# Patient Record
Sex: FEMALE | Race: WHITE | NOT HISPANIC OR LATINO | URBAN - METROPOLITAN AREA
[De-identification: names, ages, dates, MRNs, and addresses within clinical notes are randomized per-mention and may not be internally consistent; named-entity substitution may affect disease eponyms.]

---

## 2017-03-09 ENCOUNTER — INPATIENT (INPATIENT)
Facility: HOSPITAL | Age: 82
LOS: 3 days | Discharge: NURSING, REHAB OR LONG-TERM CARE | DRG: 594 | End: 2017-03-13
Attending: INTERNAL MEDICINE | Admitting: INTERNAL MEDICINE
Payer: COMMERCIAL

## 2017-03-09 VITALS
TEMPERATURE: 98 F | OXYGEN SATURATION: 98 % | SYSTOLIC BLOOD PRESSURE: 144 MMHG | WEIGHT: 130.07 LBS | RESPIRATION RATE: 14 BRPM | HEART RATE: 98 BPM | DIASTOLIC BLOOD PRESSURE: 80 MMHG

## 2017-03-09 DIAGNOSIS — L98.499 NON-PRESSURE CHRONIC ULCER OF SKIN OF OTHER SITES WITH UNSPECIFIED SEVERITY: ICD-10-CM

## 2017-03-09 LAB
ANION GAP SERPL CALC-SCNC: 8 MMOL/L — SIGNIFICANT CHANGE UP (ref 5–17)
APTT BLD: 29.1 SEC — SIGNIFICANT CHANGE UP (ref 27.5–37.4)
BASOPHILS # BLD AUTO: 0.1 K/UL — SIGNIFICANT CHANGE UP (ref 0–0.2)
BASOPHILS NFR BLD AUTO: 0.8 % — SIGNIFICANT CHANGE UP (ref 0–2)
BUN SERPL-MCNC: 12 MG/DL — SIGNIFICANT CHANGE UP (ref 7–23)
CALCIUM SERPL-MCNC: 9 MG/DL — SIGNIFICANT CHANGE UP (ref 8.5–10.1)
CHLORIDE SERPL-SCNC: 103 MMOL/L — SIGNIFICANT CHANGE UP (ref 96–108)
CO2 SERPL-SCNC: 27 MMOL/L — SIGNIFICANT CHANGE UP (ref 22–31)
CREAT SERPL-MCNC: 0.72 MG/DL — SIGNIFICANT CHANGE UP (ref 0.5–1.3)
EOSINOPHIL # BLD AUTO: 0 K/UL — SIGNIFICANT CHANGE UP (ref 0–0.5)
EOSINOPHIL NFR BLD AUTO: 0.3 % — SIGNIFICANT CHANGE UP (ref 0–6)
GLUCOSE SERPL-MCNC: 95 MG/DL — SIGNIFICANT CHANGE UP (ref 70–99)
HCT VFR BLD CALC: 41.8 % — SIGNIFICANT CHANGE UP (ref 34.5–45)
HGB BLD-MCNC: 13.6 G/DL — SIGNIFICANT CHANGE UP (ref 11.5–15.5)
INR BLD: 1.04 RATIO — SIGNIFICANT CHANGE UP (ref 0.88–1.16)
LACTATE SERPL-SCNC: 0.8 MMOL/L — SIGNIFICANT CHANGE UP (ref 0.7–2)
LYMPHOCYTES # BLD AUTO: 1.2 K/UL — SIGNIFICANT CHANGE UP (ref 1–3.3)
LYMPHOCYTES # BLD AUTO: 14.9 % — SIGNIFICANT CHANGE UP (ref 13–44)
MCHC RBC-ENTMCNC: 32.3 PG — SIGNIFICANT CHANGE UP (ref 27–34)
MCHC RBC-ENTMCNC: 32.4 GM/DL — SIGNIFICANT CHANGE UP (ref 32–36)
MCV RBC AUTO: 99.6 FL — SIGNIFICANT CHANGE UP (ref 80–100)
MONOCYTES # BLD AUTO: 0.7 K/UL — SIGNIFICANT CHANGE UP (ref 0–0.9)
MONOCYTES NFR BLD AUTO: 8.7 % — SIGNIFICANT CHANGE UP (ref 1–9)
NEUTROPHILS # BLD AUTO: 5.9 K/UL — SIGNIFICANT CHANGE UP (ref 1.8–7.4)
NEUTROPHILS NFR BLD AUTO: 75.3 % — SIGNIFICANT CHANGE UP (ref 43–77)
PLATELET # BLD AUTO: 377 K/UL — SIGNIFICANT CHANGE UP (ref 150–400)
POTASSIUM SERPL-MCNC: 3.9 MMOL/L — SIGNIFICANT CHANGE UP (ref 3.5–5.3)
POTASSIUM SERPL-SCNC: 3.9 MMOL/L — SIGNIFICANT CHANGE UP (ref 3.5–5.3)
PROTHROM AB SERPL-ACNC: 11.5 SEC — SIGNIFICANT CHANGE UP (ref 10–13.1)
RBC # BLD: 4.2 M/UL — SIGNIFICANT CHANGE UP (ref 3.8–5.2)
RBC # FLD: 12.2 % — SIGNIFICANT CHANGE UP (ref 10.3–14.5)
SODIUM SERPL-SCNC: 138 MMOL/L — SIGNIFICANT CHANGE UP (ref 135–145)
WBC # BLD: 7.9 K/UL — SIGNIFICANT CHANGE UP (ref 3.8–10.5)
WBC # FLD AUTO: 7.9 K/UL — SIGNIFICANT CHANGE UP (ref 3.8–10.5)

## 2017-03-09 PROCEDURE — 73590 X-RAY EXAM OF LOWER LEG: CPT | Mod: 26,LT

## 2017-03-09 PROCEDURE — 99285 EMERGENCY DEPT VISIT HI MDM: CPT

## 2017-03-09 PROCEDURE — 93010 ELECTROCARDIOGRAM REPORT: CPT

## 2017-03-09 PROCEDURE — 71010: CPT | Mod: 26

## 2017-03-09 RX ORDER — FOLIC ACID 0.8 MG
1 TABLET ORAL DAILY
Qty: 0 | Refills: 0 | Status: DISCONTINUED | OUTPATIENT
Start: 2017-03-09 | End: 2017-03-13

## 2017-03-09 RX ORDER — SODIUM CHLORIDE 9 MG/ML
1000 INJECTION INTRAMUSCULAR; INTRAVENOUS; SUBCUTANEOUS ONCE
Qty: 0 | Refills: 0 | Status: COMPLETED | OUTPATIENT
Start: 2017-03-09 | End: 2017-03-09

## 2017-03-09 RX ORDER — PIPERACILLIN AND TAZOBACTAM 4; .5 G/20ML; G/20ML
3.38 INJECTION, POWDER, LYOPHILIZED, FOR SOLUTION INTRAVENOUS ONCE
Qty: 0 | Refills: 0 | Status: COMPLETED | OUTPATIENT
Start: 2017-03-09 | End: 2017-03-09

## 2017-03-09 RX ORDER — SIMVASTATIN 20 MG/1
40 TABLET, FILM COATED ORAL AT BEDTIME
Qty: 0 | Refills: 0 | Status: DISCONTINUED | OUTPATIENT
Start: 2017-03-09 | End: 2017-03-13

## 2017-03-09 RX ORDER — ACETAMINOPHEN 500 MG
650 TABLET ORAL EVERY 6 HOURS
Qty: 0 | Refills: 0 | Status: DISCONTINUED | OUTPATIENT
Start: 2017-03-09 | End: 2017-03-13

## 2017-03-09 RX ORDER — GABAPENTIN 400 MG/1
300 CAPSULE ORAL DAILY
Qty: 0 | Refills: 0 | Status: DISCONTINUED | OUTPATIENT
Start: 2017-03-09 | End: 2017-03-13

## 2017-03-09 RX ORDER — PIPERACILLIN AND TAZOBACTAM 4; .5 G/20ML; G/20ML
3.38 INJECTION, POWDER, LYOPHILIZED, FOR SOLUTION INTRAVENOUS EVERY 8 HOURS
Qty: 0 | Refills: 0 | Status: DISCONTINUED | OUTPATIENT
Start: 2017-03-10 | End: 2017-03-13

## 2017-03-09 RX ADMIN — SIMVASTATIN 40 MILLIGRAM(S): 20 TABLET, FILM COATED ORAL at 22:00

## 2017-03-09 RX ADMIN — SODIUM CHLORIDE 1000 MILLILITER(S): 9 INJECTION INTRAMUSCULAR; INTRAVENOUS; SUBCUTANEOUS at 18:33

## 2017-03-09 RX ADMIN — PIPERACILLIN AND TAZOBACTAM 200 GRAM(S): 4; .5 INJECTION, POWDER, LYOPHILIZED, FOR SOLUTION INTRAVENOUS at 18:33

## 2017-03-09 NOTE — ED PROVIDER NOTE - SKIN, MLM
LLE: 15X8cm ulcer with eschar and mild surrounding erythema to mid posterior lower leg.  Clear d/c.  FROM. Motor 5/5, sensation intact, 2+DP

## 2017-03-09 NOTE — ED PROVIDER NOTE - OBJECTIVE STATEMENT
85 y/o F sent in by pmd Dr hSine and wound care for L calf wound.  Pt sustained laceration to her L calf from a sharp edge of a bus staircase 4 weeks ago.  Wound repaired at [facility].  Took course of keflex.  Now has increasing ulcer and pain at site.  Denies fever or other complaints.

## 2017-03-09 NOTE — ED ADULT TRIAGE NOTE - CHIEF COMPLAINT QUOTE
S/P fall and sustained a laceration to left lower leg. 3 weeks ago. Patient is sent by PMD today for admission and IV antibiotics.

## 2017-03-09 NOTE — ED ADULT NURSE NOTE - OBJECTIVE STATEMENT
Pt arrived c/o left leg wound sent my PMD for IV antibiotics. Pt feel 3 weeks ago and now left leg wound is infected per pt. No acute distress, pt has open wound to left lower extremity, no drainage, bandage applied by MD. Pt alert VSS, afebrile. IV started labs drawn and sent, fluids and antibiotics infusing per order, pt tolerating well. Percocet given for pain, will reassess.

## 2017-03-10 DIAGNOSIS — L98.499 NON-PRESSURE CHRONIC ULCER OF SKIN OF OTHER SITES WITH UNSPECIFIED SEVERITY: ICD-10-CM

## 2017-03-10 DIAGNOSIS — I10 ESSENTIAL (PRIMARY) HYPERTENSION: ICD-10-CM

## 2017-03-10 LAB
ANION GAP SERPL CALC-SCNC: 10 MMOL/L — SIGNIFICANT CHANGE UP (ref 5–17)
BUN SERPL-MCNC: 8 MG/DL — SIGNIFICANT CHANGE UP (ref 7–23)
CALCIUM SERPL-MCNC: 7.9 MG/DL — LOW (ref 8.5–10.1)
CHLORIDE SERPL-SCNC: 105 MMOL/L — SIGNIFICANT CHANGE UP (ref 96–108)
CO2 SERPL-SCNC: 26 MMOL/L — SIGNIFICANT CHANGE UP (ref 22–31)
CREAT SERPL-MCNC: 0.54 MG/DL — SIGNIFICANT CHANGE UP (ref 0.5–1.3)
GLUCOSE SERPL-MCNC: 71 MG/DL — SIGNIFICANT CHANGE UP (ref 70–99)
HCT VFR BLD CALC: 35.9 % — SIGNIFICANT CHANGE UP (ref 34.5–45)
HGB BLD-MCNC: 11.6 G/DL — SIGNIFICANT CHANGE UP (ref 11.5–15.5)
MCHC RBC-ENTMCNC: 32.1 PG — SIGNIFICANT CHANGE UP (ref 27–34)
MCHC RBC-ENTMCNC: 32.3 GM/DL — SIGNIFICANT CHANGE UP (ref 32–36)
MCV RBC AUTO: 99.4 FL — SIGNIFICANT CHANGE UP (ref 80–100)
PLATELET # BLD AUTO: 305 K/UL — SIGNIFICANT CHANGE UP (ref 150–400)
POTASSIUM SERPL-MCNC: 3.5 MMOL/L — SIGNIFICANT CHANGE UP (ref 3.5–5.3)
POTASSIUM SERPL-SCNC: 3.5 MMOL/L — SIGNIFICANT CHANGE UP (ref 3.5–5.3)
RBC # BLD: 3.61 M/UL — LOW (ref 3.8–5.2)
RBC # FLD: 12.5 % — SIGNIFICANT CHANGE UP (ref 10.3–14.5)
SODIUM SERPL-SCNC: 141 MMOL/L — SIGNIFICANT CHANGE UP (ref 135–145)
WBC # BLD: 7.4 K/UL — SIGNIFICANT CHANGE UP (ref 3.8–10.5)
WBC # FLD AUTO: 7.4 K/UL — SIGNIFICANT CHANGE UP (ref 3.8–10.5)

## 2017-03-10 RX ORDER — COLLAGENASE CLOSTRIDIUM HIST. 250 UNIT/G
1 OINTMENT (GRAM) TOPICAL DAILY
Qty: 0 | Refills: 0 | Status: DISCONTINUED | OUTPATIENT
Start: 2017-03-10 | End: 2017-03-13

## 2017-03-10 RX ADMIN — PIPERACILLIN AND TAZOBACTAM 25 GRAM(S): 4; .5 INJECTION, POWDER, LYOPHILIZED, FOR SOLUTION INTRAVENOUS at 12:52

## 2017-03-10 RX ADMIN — Medication 1 MILLIGRAM(S): at 11:45

## 2017-03-10 RX ADMIN — Medication 10 MILLIGRAM(S): at 06:10

## 2017-03-10 RX ADMIN — GABAPENTIN 300 MILLIGRAM(S): 400 CAPSULE ORAL at 11:45

## 2017-03-10 RX ADMIN — PIPERACILLIN AND TAZOBACTAM 25 GRAM(S): 4; .5 INJECTION, POWDER, LYOPHILIZED, FOR SOLUTION INTRAVENOUS at 17:41

## 2017-03-10 RX ADMIN — SIMVASTATIN 40 MILLIGRAM(S): 20 TABLET, FILM COATED ORAL at 21:08

## 2017-03-10 RX ADMIN — PIPERACILLIN AND TAZOBACTAM 25 GRAM(S): 4; .5 INJECTION, POWDER, LYOPHILIZED, FOR SOLUTION INTRAVENOUS at 03:40

## 2017-03-10 RX ADMIN — Medication 1 APPLICATION(S): at 14:24

## 2017-03-10 NOTE — DISCHARGE NOTE ADULT - CARE PROVIDER_API CALL
Natty Shine (TERESA), Internal Medicine  [address] [place] [id number]  Phone: [phone]  Fax: [phone]    Tommy Hernandez), Plastic Surgery  [address] [place] [address]  Phone: [phone]  Fax: [phone]

## 2017-03-10 NOTE — DISCHARGE NOTE ADULT - REASON FOR ADMISSION
sent by wound care doctor from [facility] for worseing left calf wound that happened on 2/14. pt scraped herself while getting of the bus and was on one course of keflex, wound not getting better. sent by wound care doctor from [facility] for worsening left calf wound that happened on 2/14. pt scraped herself while getting of the bus and was on one course of keflex, wound not getting better.

## 2017-03-10 NOTE — DISCHARGE NOTE ADULT - HOSPITAL COURSE
patient came with non healing left LE wound. Wound necrotic. Given iv abx for mild cellulitis. cultures negative. wound care recommended local wound care and f/u with wound care center within 1 week. seen by ID too.

## 2017-03-10 NOTE — PHYSICAL THERAPY INITIAL EVALUATION ADULT - GAIT DEVIATIONS NOTED, PT EVAL
decreased step length/hunched and crouched posture, leaning twd right side/decreased weight-shifting ability/decreased velocity of limb motion/decreased stride length

## 2017-03-10 NOTE — DISCHARGE NOTE ADULT - MEDICATION SUMMARY - MEDICATIONS TO STOP TAKING
I will STOP taking the medications listed below when I get home from the hospital:    Keflex  --  by mouth

## 2017-03-10 NOTE — DISCHARGE NOTE ADULT - ABILITY TO HEAR (WITH HEARING AID OR HEARING APPLIANCE IF NORMALLY USED):
hearing aids/Mildly to Moderately Impaired: difficulty hearing in some environments or speaker may need to increase volume or speak distinctly

## 2017-03-10 NOTE — DISCHARGE NOTE ADULT - PATIENT PORTAL LINK FT
“You can access the [facility] Patient Portal, offered by [facility], by registering with the following website: [email or web address]”

## 2017-03-10 NOTE — PHYSICAL THERAPY INITIAL EVALUATION ADULT - GENERAL OBSERVATIONS, REHAB EVAL
Pt received in bed on 1 east /c eldest son present. Pt was NAD but felt that she needed reposition herself. Pt and son agreeable for PT eval.

## 2017-03-10 NOTE — H&P ADULT. - REASON FOR ADMISSION
sent by wound care doctor from [facility] for worseing left calf wound that happened on 2/14. pt scraped herself while getting of the bus and was on one course of keflex, wound not getting better.

## 2017-03-10 NOTE — PHYSICAL THERAPY INITIAL EVALUATION ADULT - IMPAIRMENTS FOUND, PT EVAL
gait, locomotion, and balance/aerobic capacity/endurance/integumentary integrity/muscle strength/posture

## 2017-03-10 NOTE — PHYSICAL THERAPY INITIAL EVALUATION ADULT - IMPAIRMENTS CONTRIBUTING TO GAIT DEVIATIONS, PT EVAL
narrow base of support/pain/impaired postural control/decreased strength/impaired balance/decreased flexibility/impaired coordination

## 2017-03-10 NOTE — PHYSICAL THERAPY INITIAL EVALUATION ADULT - PERTINENT HX OF CURRENT PROBLEM, REHAB EVAL
As per H&P: Pt is a 85 y/o female "sent by wound care doctor from [facility] for worseing left calf wound that happened on 2/14. pt scraped herself while getting of the bus and was on one course of keflex, wound not getting better."

## 2017-03-10 NOTE — PHYSICAL THERAPY INITIAL EVALUATION ADULT - IMPAIRED TRANSFERS: SIT/STAND, REHAB EVAL
decreased strength/left calf/decreased flexibility/impaired balance/narrow base of support/pain/impaired postural control

## 2017-03-10 NOTE — PHYSICAL THERAPY INITIAL EVALUATION ADULT - ADDITIONAL COMMENTS
Pt lives in a private home /c her youngest son. Pt has 5 CHONG and 1 handrail, none to negotiate indoors. Pt is on main level. Pt was independent /c all functional mobility and ADLs /c cane prior to admission. pt has 2 canes and 1 rollator which she uses prn. Pt attends the senior center where she participates with activities like dancing. sons are very supportive.

## 2017-03-10 NOTE — DISCHARGE NOTE ADULT - COMMUNITY RESOURCES
Pt to be discharged to [facility] at [facility]. # [phone]

## 2017-03-10 NOTE — PHYSICAL THERAPY INITIAL EVALUATION ADULT - PATIENT/FAMILY/SIGNIFICANT OTHER GOALS STATEMENT, PT EVAL
Pt prefers to go home at [facility] but son thinks pt may need some rehab. Pt would be agreeable if she needed

## 2017-03-10 NOTE — PHYSICAL THERAPY INITIAL EVALUATION ADULT - RANGE OF MOTION EXAMINATION, REHAB EVAL
slight limitation in left ankle DF due to/bilateral lower extremity ROM was WFL (within functional limits)/bilateral upper extremity ROM was WFL (within functional limits)/deficits as listed below

## 2017-03-10 NOTE — PHYSICAL THERAPY INITIAL EVALUATION ADULT - SKIN COLOR/CHARACTERISTICS
redness blanchable/black discoloration noted to base of wound, slight yellow slough noted to perimeter of wound. MILLA Veliz aware and applied new dressing, gauze and topical medication as per MD orders.

## 2017-03-10 NOTE — DISCHARGE NOTE ADULT - MEDICATION SUMMARY - MEDICATIONS TO TAKE
I will START or STAY ON the medications listed below when I get home from the hospital:    acetaminophen-oxyCODONE 325 mg-5 mg oral tablet  -- 1 tab(s) by mouth every 6 hours, As needed, mod-severe  -- Indication: For pain    acetaminophen 325 mg oral tablet  -- 2 tab(s) by mouth every 6 hours, As needed, Mild Pain (1 - 3)  -- Indication: For pain    enalapril  -- 10  by mouth once a day  -- Indication: For HTN (hypertension)    gabapentin 300 mg oral capsule  -- 1 cap(s) by mouth once a day  -- Indication: For Neuropathy    simvastatin 40 mg oral tablet  -- 1 tab(s) by mouth once a day (at bedtime)  -- Indication: For cholesterol    collagenase 250 units/g topical ointment  -- 1 application on skin once a day, to wound and apply DSD qd  -- Indication: For wound    famotidine 20 mg oral tablet  -- 1 tab(s) by mouth 2 times a day  -- Indication: For gerd    senna oral tablet  -- 2 tab(s) by mouth once a day (at bedtime)  -- Indication: For gi    Augmentin 875 mg-125 mg oral tablet  -- 1 tab(s) by mouth every 12 hours x 7 days  -- Indication: For wound    lactobacillus acidophilus oral capsule  -- 1 cap(s) by mouth 3 times a day  -- Indication: For sup    folic acid 1 mg oral tablet  -- 1 tab(s) by mouth once a day  -- Indication: For supp

## 2017-03-10 NOTE — DISCHARGE NOTE ADULT - CARE PLAN
Principal Discharge DX:	Ulcer  Goal:	.  Instructions for follow-up, activity and diet:	con't wound care and abx. follow up with wound care within 1 week [phone] Principal Discharge DX:	Ulcer  Goal:	.  Instructions for follow-up, activity and diet:	con't wound care and abx. follow up with wound care within 1 week [phone]

## 2017-03-11 RX ADMIN — PIPERACILLIN AND TAZOBACTAM 25 GRAM(S): 4; .5 INJECTION, POWDER, LYOPHILIZED, FOR SOLUTION INTRAVENOUS at 17:14

## 2017-03-11 RX ADMIN — GABAPENTIN 300 MILLIGRAM(S): 400 CAPSULE ORAL at 11:00

## 2017-03-11 RX ADMIN — PIPERACILLIN AND TAZOBACTAM 25 GRAM(S): 4; .5 INJECTION, POWDER, LYOPHILIZED, FOR SOLUTION INTRAVENOUS at 10:49

## 2017-03-11 RX ADMIN — SIMVASTATIN 40 MILLIGRAM(S): 20 TABLET, FILM COATED ORAL at 22:02

## 2017-03-11 RX ADMIN — Medication 1 APPLICATION(S): at 14:04

## 2017-03-11 RX ADMIN — Medication 1 MILLIGRAM(S): at 11:00

## 2017-03-11 RX ADMIN — PIPERACILLIN AND TAZOBACTAM 25 GRAM(S): 4; .5 INJECTION, POWDER, LYOPHILIZED, FOR SOLUTION INTRAVENOUS at 03:06

## 2017-03-11 RX ADMIN — Medication 10 MILLIGRAM(S): at 05:08

## 2017-03-12 LAB
ANION GAP SERPL CALC-SCNC: 11 MMOL/L — SIGNIFICANT CHANGE UP (ref 5–17)
BASOPHILS # BLD AUTO: 0.1 K/UL — SIGNIFICANT CHANGE UP (ref 0–0.2)
BASOPHILS NFR BLD AUTO: 1 % — SIGNIFICANT CHANGE UP (ref 0–2)
BUN SERPL-MCNC: 8 MG/DL — SIGNIFICANT CHANGE UP (ref 7–23)
CALCIUM SERPL-MCNC: 8.6 MG/DL — SIGNIFICANT CHANGE UP (ref 8.5–10.1)
CHLORIDE SERPL-SCNC: 106 MMOL/L — SIGNIFICANT CHANGE UP (ref 96–108)
CO2 SERPL-SCNC: 24 MMOL/L — SIGNIFICANT CHANGE UP (ref 22–31)
CREAT SERPL-MCNC: 0.74 MG/DL — SIGNIFICANT CHANGE UP (ref 0.5–1.3)
CULTURE RESULTS: SIGNIFICANT CHANGE UP
EOSINOPHIL # BLD AUTO: 0.1 K/UL — SIGNIFICANT CHANGE UP (ref 0–0.5)
EOSINOPHIL NFR BLD AUTO: 1.4 % — SIGNIFICANT CHANGE UP (ref 0–6)
GLUCOSE SERPL-MCNC: 98 MG/DL — SIGNIFICANT CHANGE UP (ref 70–99)
HCT VFR BLD CALC: 37.8 % — SIGNIFICANT CHANGE UP (ref 34.5–45)
HGB BLD-MCNC: 12.8 G/DL — SIGNIFICANT CHANGE UP (ref 11.5–15.5)
LYMPHOCYTES # BLD AUTO: 1.4 K/UL — SIGNIFICANT CHANGE UP (ref 1–3.3)
LYMPHOCYTES # BLD AUTO: 18 % — SIGNIFICANT CHANGE UP (ref 13–44)
MCHC RBC-ENTMCNC: 32.4 PG — SIGNIFICANT CHANGE UP (ref 27–34)
MCHC RBC-ENTMCNC: 33.9 GM/DL — SIGNIFICANT CHANGE UP (ref 32–36)
MCV RBC AUTO: 95.8 FL — SIGNIFICANT CHANGE UP (ref 80–100)
MONOCYTES # BLD AUTO: 0.8 K/UL — SIGNIFICANT CHANGE UP (ref 0–0.9)
MONOCYTES NFR BLD AUTO: 10.2 % — HIGH (ref 1–9)
NEUTROPHILS # BLD AUTO: 5.5 K/UL — SIGNIFICANT CHANGE UP (ref 1.8–7.4)
NEUTROPHILS NFR BLD AUTO: 69.4 % — SIGNIFICANT CHANGE UP (ref 43–77)
PLATELET # BLD AUTO: 339 K/UL — SIGNIFICANT CHANGE UP (ref 150–400)
POTASSIUM SERPL-MCNC: 3.5 MMOL/L — SIGNIFICANT CHANGE UP (ref 3.5–5.3)
POTASSIUM SERPL-SCNC: 3.5 MMOL/L — SIGNIFICANT CHANGE UP (ref 3.5–5.3)
RBC # BLD: 3.95 M/UL — SIGNIFICANT CHANGE UP (ref 3.8–5.2)
RBC # FLD: 11.7 % — SIGNIFICANT CHANGE UP (ref 10.3–14.5)
SODIUM SERPL-SCNC: 141 MMOL/L — SIGNIFICANT CHANGE UP (ref 135–145)
SPECIMEN SOURCE: SIGNIFICANT CHANGE UP
WBC # BLD: 8 K/UL — SIGNIFICANT CHANGE UP (ref 3.8–10.5)
WBC # FLD AUTO: 8 K/UL — SIGNIFICANT CHANGE UP (ref 3.8–10.5)

## 2017-03-12 RX ORDER — FAMOTIDINE 10 MG/ML
20 INJECTION INTRAVENOUS
Qty: 0 | Refills: 0 | Status: DISCONTINUED | OUTPATIENT
Start: 2017-03-12 | End: 2017-03-13

## 2017-03-12 RX ORDER — LACTOBACILLUS ACIDOPHILUS 100MM CELL
1 CAPSULE ORAL
Qty: 0 | Refills: 0 | Status: DISCONTINUED | OUTPATIENT
Start: 2017-03-12 | End: 2017-03-13

## 2017-03-12 RX ORDER — SENNA PLUS 8.6 MG/1
2 TABLET ORAL AT BEDTIME
Qty: 0 | Refills: 0 | Status: DISCONTINUED | OUTPATIENT
Start: 2017-03-12 | End: 2017-03-13

## 2017-03-12 RX ORDER — HEPARIN SODIUM 5000 [USP'U]/ML
5000 INJECTION INTRAVENOUS; SUBCUTANEOUS EVERY 12 HOURS
Qty: 0 | Refills: 0 | Status: DISCONTINUED | OUTPATIENT
Start: 2017-03-12 | End: 2017-03-13

## 2017-03-12 RX ADMIN — Medication 1 TABLET(S): at 17:25

## 2017-03-12 RX ADMIN — Medication 1 MILLIGRAM(S): at 11:54

## 2017-03-12 RX ADMIN — Medication 1 APPLICATION(S): at 11:54

## 2017-03-12 RX ADMIN — GABAPENTIN 300 MILLIGRAM(S): 400 CAPSULE ORAL at 11:54

## 2017-03-12 RX ADMIN — Medication 1 TABLET(S): at 11:54

## 2017-03-12 RX ADMIN — SIMVASTATIN 40 MILLIGRAM(S): 20 TABLET, FILM COATED ORAL at 21:23

## 2017-03-12 RX ADMIN — Medication 5 MILLIGRAM(S): at 17:25

## 2017-03-12 RX ADMIN — SENNA PLUS 2 TABLET(S): 8.6 TABLET ORAL at 21:23

## 2017-03-12 RX ADMIN — PIPERACILLIN AND TAZOBACTAM 25 GRAM(S): 4; .5 INJECTION, POWDER, LYOPHILIZED, FOR SOLUTION INTRAVENOUS at 06:02

## 2017-03-12 RX ADMIN — FAMOTIDINE 20 MILLIGRAM(S): 10 INJECTION INTRAVENOUS at 17:25

## 2017-03-12 RX ADMIN — HEPARIN SODIUM 5000 UNIT(S): 5000 INJECTION INTRAVENOUS; SUBCUTANEOUS at 17:25

## 2017-03-12 RX ADMIN — PIPERACILLIN AND TAZOBACTAM 25 GRAM(S): 4; .5 INJECTION, POWDER, LYOPHILIZED, FOR SOLUTION INTRAVENOUS at 13:50

## 2017-03-12 RX ADMIN — Medication 1 TABLET(S): at 07:58

## 2017-03-13 VITALS
SYSTOLIC BLOOD PRESSURE: 120 MMHG | OXYGEN SATURATION: 95 % | HEART RATE: 95 BPM | DIASTOLIC BLOOD PRESSURE: 64 MMHG | RESPIRATION RATE: 16 BRPM | TEMPERATURE: 99 F

## 2017-03-13 LAB
ANION GAP SERPL CALC-SCNC: 11 MMOL/L — SIGNIFICANT CHANGE UP (ref 5–17)
BUN SERPL-MCNC: 7 MG/DL — SIGNIFICANT CHANGE UP (ref 7–23)
CALCIUM SERPL-MCNC: 8.6 MG/DL — SIGNIFICANT CHANGE UP (ref 8.5–10.1)
CHLORIDE SERPL-SCNC: 106 MMOL/L — SIGNIFICANT CHANGE UP (ref 96–108)
CO2 SERPL-SCNC: 23 MMOL/L — SIGNIFICANT CHANGE UP (ref 22–31)
CREAT SERPL-MCNC: 0.81 MG/DL — SIGNIFICANT CHANGE UP (ref 0.5–1.3)
GLUCOSE SERPL-MCNC: 95 MG/DL — SIGNIFICANT CHANGE UP (ref 70–99)
HCT VFR BLD CALC: 36 % — SIGNIFICANT CHANGE UP (ref 34.5–45)
HGB BLD-MCNC: 11.7 G/DL — SIGNIFICANT CHANGE UP (ref 11.5–15.5)
MCHC RBC-ENTMCNC: 32.3 PG — SIGNIFICANT CHANGE UP (ref 27–34)
MCHC RBC-ENTMCNC: 32.4 GM/DL — SIGNIFICANT CHANGE UP (ref 32–36)
MCV RBC AUTO: 99.4 FL — SIGNIFICANT CHANGE UP (ref 80–100)
PLATELET # BLD AUTO: 326 K/UL — SIGNIFICANT CHANGE UP (ref 150–400)
POTASSIUM SERPL-MCNC: 3.7 MMOL/L — SIGNIFICANT CHANGE UP (ref 3.5–5.3)
POTASSIUM SERPL-SCNC: 3.7 MMOL/L — SIGNIFICANT CHANGE UP (ref 3.5–5.3)
RBC # BLD: 3.62 M/UL — LOW (ref 3.8–5.2)
RBC # FLD: 12.2 % — SIGNIFICANT CHANGE UP (ref 10.3–14.5)
SODIUM SERPL-SCNC: 140 MMOL/L — SIGNIFICANT CHANGE UP (ref 135–145)
WBC # BLD: 7.3 K/UL — SIGNIFICANT CHANGE UP (ref 3.8–10.5)
WBC # FLD AUTO: 7.3 K/UL — SIGNIFICANT CHANGE UP (ref 3.8–10.5)

## 2017-03-13 PROCEDURE — 71045 X-RAY EXAM CHEST 1 VIEW: CPT

## 2017-03-13 PROCEDURE — 93005 ELECTROCARDIOGRAM TRACING: CPT

## 2017-03-13 PROCEDURE — 99285 EMERGENCY DEPT VISIT HI MDM: CPT | Mod: 25

## 2017-03-13 PROCEDURE — 73590 X-RAY EXAM OF LOWER LEG: CPT

## 2017-03-13 PROCEDURE — 96376 TX/PRO/DX INJ SAME DRUG ADON: CPT

## 2017-03-13 PROCEDURE — 97530 THERAPEUTIC ACTIVITIES: CPT

## 2017-03-13 PROCEDURE — 87040 BLOOD CULTURE FOR BACTERIA: CPT

## 2017-03-13 PROCEDURE — 84145 PROCALCITONIN (PCT): CPT

## 2017-03-13 PROCEDURE — 80048 BASIC METABOLIC PNL TOTAL CA: CPT

## 2017-03-13 PROCEDURE — 96374 THER/PROPH/DIAG INJ IV PUSH: CPT

## 2017-03-13 PROCEDURE — 85730 THROMBOPLASTIN TIME PARTIAL: CPT

## 2017-03-13 PROCEDURE — 85027 COMPLETE CBC AUTOMATED: CPT

## 2017-03-13 PROCEDURE — 85610 PROTHROMBIN TIME: CPT

## 2017-03-13 PROCEDURE — 97162 PT EVAL MOD COMPLEX 30 MIN: CPT

## 2017-03-13 PROCEDURE — 83605 ASSAY OF LACTIC ACID: CPT

## 2017-03-13 PROCEDURE — 87070 CULTURE OTHR SPECIMN AEROBIC: CPT

## 2017-03-13 PROCEDURE — 97116 GAIT TRAINING THERAPY: CPT

## 2017-03-13 RX ORDER — OXYCODONE HYDROCHLORIDE 5 MG/1
1 TABLET ORAL
Qty: 0 | Refills: 0 | COMMUNITY
Start: 2017-03-13

## 2017-03-13 RX ORDER — SENNA PLUS 8.6 MG/1
2 TABLET ORAL
Qty: 0 | Refills: 0 | COMMUNITY
Start: 2017-03-13

## 2017-03-13 RX ORDER — SIMVASTATIN 20 MG/1
1 TABLET, FILM COATED ORAL
Qty: 0 | Refills: 0 | COMMUNITY
Start: 2017-03-13

## 2017-03-13 RX ORDER — COLLAGENASE CLOSTRIDIUM HIST. 250 UNIT/G
1 OINTMENT (GRAM) TOPICAL
Qty: 0 | Refills: 0 | COMMUNITY
Start: 2017-03-13

## 2017-03-13 RX ORDER — GABAPENTIN 400 MG/1
1 CAPSULE ORAL
Qty: 0 | Refills: 0 | COMMUNITY
Start: 2017-03-13

## 2017-03-13 RX ORDER — ACETAMINOPHEN 500 MG
2 TABLET ORAL
Qty: 0 | Refills: 0 | COMMUNITY
Start: 2017-03-13

## 2017-03-13 RX ORDER — FAMOTIDINE 10 MG/ML
1 INJECTION INTRAVENOUS
Qty: 0 | Refills: 0 | COMMUNITY
Start: 2017-03-13

## 2017-03-13 RX ORDER — LACTOBACILLUS ACIDOPHILUS 100MM CELL
1 CAPSULE ORAL
Qty: 0 | Refills: 0 | COMMUNITY
Start: 2017-03-13

## 2017-03-13 RX ORDER — FOLIC ACID 0.8 MG
1 TABLET ORAL
Qty: 0 | Refills: 0 | COMMUNITY
Start: 2017-03-13

## 2017-03-13 RX ADMIN — PIPERACILLIN AND TAZOBACTAM 25 GRAM(S): 4; .5 INJECTION, POWDER, LYOPHILIZED, FOR SOLUTION INTRAVENOUS at 17:53

## 2017-03-13 RX ADMIN — Medication 1 TABLET(S): at 11:35

## 2017-03-13 RX ADMIN — PIPERACILLIN AND TAZOBACTAM 25 GRAM(S): 4; .5 INJECTION, POWDER, LYOPHILIZED, FOR SOLUTION INTRAVENOUS at 02:45

## 2017-03-13 RX ADMIN — Medication 5 MILLIGRAM(S): at 05:11

## 2017-03-13 RX ADMIN — Medication 5 MILLIGRAM(S): at 17:50

## 2017-03-13 RX ADMIN — GABAPENTIN 300 MILLIGRAM(S): 400 CAPSULE ORAL at 11:35

## 2017-03-13 RX ADMIN — HEPARIN SODIUM 5000 UNIT(S): 5000 INJECTION INTRAVENOUS; SUBCUTANEOUS at 05:11

## 2017-03-13 RX ADMIN — FAMOTIDINE 20 MILLIGRAM(S): 10 INJECTION INTRAVENOUS at 17:50

## 2017-03-13 RX ADMIN — HEPARIN SODIUM 5000 UNIT(S): 5000 INJECTION INTRAVENOUS; SUBCUTANEOUS at 17:50

## 2017-03-13 RX ADMIN — PIPERACILLIN AND TAZOBACTAM 25 GRAM(S): 4; .5 INJECTION, POWDER, LYOPHILIZED, FOR SOLUTION INTRAVENOUS at 11:34

## 2017-03-13 RX ADMIN — Medication 1 APPLICATION(S): at 18:40

## 2017-03-13 RX ADMIN — FAMOTIDINE 20 MILLIGRAM(S): 10 INJECTION INTRAVENOUS at 05:11

## 2017-03-13 RX ADMIN — Medication 1 TABLET(S): at 08:13

## 2017-03-13 RX ADMIN — Medication 1 TABLET(S): at 17:50

## 2017-03-13 RX ADMIN — Medication 1 MILLIGRAM(S): at 11:35

## 2017-03-15 LAB
CULTURE RESULTS: SIGNIFICANT CHANGE UP
CULTURE RESULTS: SIGNIFICANT CHANGE UP
SPECIMEN SOURCE: SIGNIFICANT CHANGE UP
SPECIMEN SOURCE: SIGNIFICANT CHANGE UP

## 2017-03-16 DIAGNOSIS — G89.29 OTHER CHRONIC PAIN: ICD-10-CM

## 2017-03-16 DIAGNOSIS — I10 ESSENTIAL (PRIMARY) HYPERTENSION: ICD-10-CM

## 2017-03-16 DIAGNOSIS — L97.224: ICD-10-CM

## 2017-03-16 DIAGNOSIS — E78.5 HYPERLIPIDEMIA, UNSPECIFIED: ICD-10-CM

## 2017-06-13 NOTE — H&P ADULT. - PROBLEM SELECTOR PROBLEM 1
Chief complaint:   Chief Complaint   Patient presents with   • Diabetes     DM 2 follow and hypothyroidism.        Vitals:  Visit Vitals   • /82   • Pulse 64   • Ht 5' 4.5\" (1.638 m)   • Wt 96.2 kg   • LMP 08/01/2016 (Exact Date)   • BMI 35.83 kg/m2       HISTORY OF PRESENT ILLNESS     HPI Comments: Jennifer comes for follow-up of type 2 diabetes, hypothyroidism and other medical problems. She is on Novolin N 40 units before breakfast and at bedtime and NovoLog 35 units before breakfast, 45 units before supper. Her meter memory was reviewed. Morning BGs range from 146 to 218. Lunchtime BGs range from 87 to 215. Suppertime BGs range from 155 to 260. Bedtime BGs range from 49 to 167. She had an episode of hypoglycemia at 1:35 PM when she did not eat breakfast. She is no longer breast-feeding.    She is now on levothyroxine 125 µg daily.      Diabetes   She presents for her follow-up diabetic visit. She has type 2 diabetes mellitus. Onset time: About 1995. Her disease course has been stable. Hypoglycemia symptoms include dizziness and headaches. Pertinent negatives for hypoglycemia include no nervousness/anxiousness, seizures or tremors. Associated symptoms include fatigue. Pertinent negatives for diabetes include no chest pain, no foot paresthesias, no foot ulcerations, no polydipsia, no polyuria and no weakness. There are no hypoglycemic complications. Symptoms are stable. Diabetic complications include nephropathy and retinopathy. Pertinent negatives for diabetic complications include no peripheral neuropathy. An ACE inhibitor/angiotensin II receptor blocker is not being taken. Eye exam is current.   Thyroid Problem   Presents for follow-up visit. Symptoms include fatigue. Patient reports no anxiety, cold intolerance, constipation, depressed mood, diaphoresis, diarrhea, dry skin, hair loss, heat intolerance, menstrual problem, palpitations or tremors. Past treatments include levothyroxine.       Meter  downloaded for appointment.    Diabetic education:  Yes years ago   Denies need for refresher.     Dilated eye exam: 5-5-17  Retinopathy= Yes.     Checks feet daily. No problems noted.   Denies need for podiatrist.    Rotating injection sites:  Yes, no scar tissue noted.     Patient here alone for visit.     Lab Results   Component Value Date    HGBA1C 5.0 06/09/2017    HGBA1C 7.6 (H) 09/19/2016    HGBA1C 7.4 (H) 06/16/2016    HGBA1C 7.6 (H) 05/20/2016    HGBA1C 9.3 (H) 12/22/2015     No results found for: CALCLDL  Lab Results   Component Value Date    LDLDIR 81 06/16/2016    LDLDIR 74 09/21/2015     Lab Results   Component Value Date    GFRA >90 05/16/2017    GFRA >90 05/15/2017     Lab Results   Component Value Date    GFRNA >90 05/16/2017    GFRNA >90 05/15/2017     Lab Results   Component Value Date    URMIC 47.20 06/09/2017    URMIC 2.70 06/16/2016     Lab Results   Component Value Date    MALBCR 304.5 (H) 06/09/2017    MALBCR 34.1 (H) 06/16/2016       Other significant problems:  Patient Active Problem List    Diagnosis Date Noted   • Pre-eclampsia in second trimester (MFM) 03/06/2017     Priority: High     Class: Current Pregnancy   • Pre-existing type 2 diabetes mellitus (M) 10/25/2016     Priority: Medium     Class: Current Pregnancy   • Hypothyroidism ([facility]) 10/25/2016     Priority: Medium     Class: Current Pregnancy   • Tubal ligation evaluation 05/04/2017     Priority: Low     Desires tubal ligation at time of C section or postpartum     • Hypothyroidism 09/04/2014     Priority: Low       PAST MEDICAL, FAMILY AND SOCIAL HISTORY     Medications:  Current Outpatient Prescriptions   Medication   • oxyCODONE/APAP (PERCOCET) 5-325 MG per tablet   • insulin NPH (NOVOLIN N) 100 UNIT/ML injectable suspension   • insulin aspart (NOVOLOG FLEXPEN) 100 UNIT/ML pen-injector   • levothyroxine (SYNTHROID, LEVOTHROID) 125 MCG tablet   • Insulin Pen Needle (B-D U/F PEN NEEDLE) 31G X 5 MM Misc   • blood glucose test  strips (RELION PRIME TEST)   • Nutritional Supplements (GLUCERNA) Liquid   • blood glucose test strips (RELION PRIME TEST)   • Methylcellulose, Laxative, (CITRUCEL PO)   • Loratadine (CLARITIN) 10 MG Cap   • Insulin Syringe-Needle U-100 30G X 5/16\" 1 ML Misc   • Prenatal-FeCbn-FeAspGl-FA-Omeg (ULTIMATECARE ONE) 27-1 MG Cap   • vitamin D, Cholecalciferol, 1000 UNITS capsule   • albuterol (PROAIR HFA) 108 (90 BASE) MCG/ACT inhaler   • Lancets 28G MISC     No current facility-administered medications for this visit.        Allergies:  ALLERGIES:   Allergen Reactions   • Amoxicillin RASH   • Augmentin [Amoxicillin-Pot Clavulanate] RASH   • Byetta HIVES and RASH     May 2015   • Lisinopril Cough   • Nicorette SWELLING       Past Medical  History/Surgeries:  Past Medical History:   Diagnosis Date   • Abnormal Pap smear of cervix      Repeat normal ([facility], Family [facility] )    • Diabetes mellitus, type 2 (CMS/[facility])    • Difficult intubation 2017    Difficult mask moderate difficulty with video scope    • Essential (primary) hypertension    • Gastro-oesophageal reflux disease    • Hyperlipemia    • Hypothyroidism 2014   • RAD (reactive airway disease)    • Uncontrolled type 2 diabetes mellitus with insulin therapy (CMS/[facility])        Past Surgical History:   Procedure Laterality Date   •  SECTION, LOW TRANSVERSE  2017    Primary  Section    • LASER SURGERY OF EYE Left 2017    Pan Retinal Photocoagulation  Dr. Nascimento   • TONSILLECTOMY AND ADENOIDECTOMY          Family History:  Family History   Problem Relation Age of Onset   • Heart disease Mother      CHF, 73   • Diabetes Mother    • High blood pressure Mother    • Kidney disease Mother      Dialysis    • Heart Mother      Pacemaker    • Asthma Mother    • Heart disease Father 55     CAD   • Diabetes Father    • High blood pressure Father        Social History:  Social History   Substance Use Topics   • Smoking status:  Former Smoker     Packs/day: 0.15     Years: 18.00     Types: Cigarettes     Quit date: 1/13/2017   • Smokeless tobacco: Never Used      Comment: Smokes 1 cigarettes/day. Working on quitting on own.    • Alcohol use No      Comment: none        REVIEW OF SYSTEMS     Review of Systems   Constitutional: Positive for fatigue. Negative for appetite change, chills, diaphoresis, fever and unexpected weight change.   HENT: Negative for trouble swallowing and voice change.         No voice hoarseness.   Eyes: Positive for pain and visual disturbance.   Respiratory: Negative for cough, shortness of breath and wheezing.         No snoring.   Cardiovascular: Negative for chest pain and palpitations.   Gastrointestinal: Negative for abdominal pain, constipation, diarrhea, nausea and vomiting.   Endocrine: Negative for cold intolerance, heat intolerance, polydipsia and polyuria.        No decreased libido.  No galactorrhea.  No neck swelling.   Genitourinary: Negative for dysuria, frequency and menstrual problem.        No nocturia.   Musculoskeletal: Negative for arthralgias, back pain, myalgias and neck pain.   Skin: Negative for color change, rash and wound.        No nail problems.  No dry skin.  No foot ulcer.  No hirsutism.  No hair loss.   Neurological: Positive for dizziness and headaches. Negative for tremors, seizures, syncope, weakness, light-headedness and numbness.   Hematological: Negative for adenopathy. Does not bruise/bleed easily.   Psychiatric/Behavioral: Negative for decreased concentration, dysphoric mood and sleep disturbance. The patient is not nervous/anxious and is not hyperactive.        PHYSICAL EXAM     Physical Exam   Constitutional: She appears well-developed. No distress.   Obese.       ASSESSMENT/PLAN     #1. Type 2 diabetes, uncontrolled, with both hypo- and hyperglycemia, complicated by nephropathy and retinopathy. Until she uses up her Novolin N, will give 40 units in the morning and 48 units  at bedtime. Will then switch her to Lantus 70 units and NovoLog 24 units before meals plus a sliding scale with sensitivity ratio 15 and target 120. Follow-up with me in 3 weeks.    #2. Hypertension and microalbuminuria. Start losartan 50 mg daily.    #3. Hypothyroidism. Plan to check TSH in about 6 weeks.   Ulcer

## 2017-11-08 RX ORDER — GABAPENTIN 400 MG/1
300 CAPSULE ORAL
Qty: 0 | Refills: 0 | COMMUNITY

## 2017-11-08 RX ORDER — TIZANIDINE 4 MG/1
4 TABLET ORAL
Qty: 0 | Refills: 0 | COMMUNITY

## 2017-11-08 RX ORDER — SIMVASTATIN 20 MG/1
40 TABLET, FILM COATED ORAL
Qty: 0 | Refills: 0 | COMMUNITY

## 2017-11-08 RX ORDER — CEPHALEXIN 500 MG
0 CAPSULE ORAL
Qty: 0 | Refills: 0 | COMMUNITY

## 2017-11-08 RX ORDER — SIMVASTATIN 20 MG/1
0 TABLET, FILM COATED ORAL
Qty: 0 | Refills: 0 | COMMUNITY

## 2017-11-08 RX ORDER — FOLIC ACID 0.8 MG
0 TABLET ORAL
Qty: 0 | Refills: 0 | COMMUNITY

## 2017-11-08 RX ORDER — FOLIC ACID 0.8 MG
1 TABLET ORAL
Qty: 0 | Refills: 0 | COMMUNITY

## 2017-11-08 RX ORDER — GABAPENTIN 400 MG/1
0 CAPSULE ORAL
Qty: 0 | Refills: 0 | COMMUNITY

## 2017-11-08 RX ORDER — TIZANIDINE 4 MG/1
0 TABLET ORAL
Qty: 0 | Refills: 0 | COMMUNITY

## 2020-03-02 NOTE — H&P ADULT. - PMH
Per provider patient needs to come in and get her labs done. INR labs. Nurse did speak with patient and she has a lab scheduled for tomorrow. Medication dosage confirmed and patient has correct dosage adjustments. Will re assess patients INR levels tomorrow.    HTN (hypertension)    Hyperlipidemia

## 2020-06-01 NOTE — PATIENT PROFILE ADULT. - TEACHING/LEARNING FACTORS INFLUENCE READINESS TO LEARN
Hpi Title: Evaluation of Skin Lesions I spoke to patient and advised her she is to take 75mcg daily .\"on one day only she is to ADD a 50 mcg tablet\"  Patient understands.   Patient wants to speak to the clarify the changes in her medications.   How Severe Are Your Spot(S)?: mild Have Your Spot(S) Been Treated In The Past?: has not been treated none

## 2022-06-14 NOTE — ED ADULT NURSE NOTE - LATERALITY
Subjective:        Mag Silva is a 75 y.o. female who here for follow up    Afib      HPI     This is a 75-year-old female who is known to this provider, she follows up for management of atrial fibrillation.  She has CAD, paroxysmal atrial fibrillation anticoagulated on Eliquis, dyslipidemia, hypertension, COPD.  Echo 8/31/2021 EF 70%, normal LV function.  Stress test 8/31/2021 myocardial fusion imaging indicates a normal study with no evidence of ischemia.  Holter monitor 10/4/2021 showed normal sinus rhythm with frequent PACs, PVCs, couplets, NS SVTs.  Carotid artery duplex in August 2017 revealed bilateral moderate carotid artery stenosis.  Lipid panel 8/4/2021 , HDL 37, LDL 55, trig 193.    11/2/2021-seen in office Lopressor decreased to once a day to be taken at nighttime due to fatigue.    5/3/2022-seen in office with complaints of occasional palpitations, instructed to take additional metoprolol as needed, ECG sinus rhythm.    Today in office she reports that she has felt like she has been in A. fib since Sunday.  Patient has been monitoring heart rate with pulse ox and heart rate anywhere from .  She has taken an additional metoprolol without improvement in symptoms.  She reports that she has been feeling very unwell, short of air, fatigued, nauseated at times.    The following portions of the patient's history were reviewed and updated as appropriate: allergies, current medications, past family history, past medical history, past social history, past surgical history and problem list.    Past Medical History:   Diagnosis Date   • Atrial fibrillation (HCC)    • Chest pain    • COPD (chronic obstructive pulmonary disease) (HCC)    • Coronary artery disease    • Crescendo angina (HCC)    • Dyslipidemia    • Hypertension    • PVC (premature ventricular contraction)    • Syncope          reports that she quit smoking about 18 years ago. Her smoking use included cigarettes. She has a 30.00  pack-year smoking history. She has never used smokeless tobacco. She reports that she does not drink alcohol and does not use drugs.     Family History   Problem Relation Age of Onset   • Stroke Mother    • Asthma Father    • Cancer Father        ROS     Review of Systems  Constitutional: No wt loss, fever, + fatigue  Gastrointestinal: + Nausea, no abdominal pain  Behavioral/Psych: No insomnia or anxiety  Cardiovascular no chest pain  + palpitations       Objective:           Vitals and nursing note reviewed.   Constitutional:       Appearance: Well-developed.   HENT:      Head: Normocephalic.      Right Ear: External ear normal.      Left Ear: External ear normal.   Neck:      Vascular: No JVD.   Pulmonary:      Effort: Pulmonary effort is normal. No respiratory distress.      Breath sounds: Normal breath sounds. No stridor. No rales.   Cardiovascular:      Tachycardia present. Irregularly irregular rhythm.      No gallop.   Pulses:     Intact distal pulses.   Abdominal:      General: Bowel sounds are normal. There is no distension.      Palpations: Abdomen is soft.      Tenderness: There is no abdominal tenderness. There is no guarding.   Musculoskeletal: Normal range of motion.         General: No tenderness.      Cervical back: Normal range of motion. Skin:     General: Skin is warm.   Neurological:      Mental Status: Alert and oriented to person, place, and time.      Deep Tendon Reflexes: Reflexes are normal and symmetric.   Psychiatric:         Judgment: Judgment normal.           ECG 12 Lead    Date/Time: 6/14/2022 2:33 PM  Performed by: Anne Lizama APRN  Authorized by: Anne Lizama APRN   Comparison: compared with previous ECG from 5/3/2022  Comparison to previous ECG: A. fib with RVR has replaced sinus rhythm  Rhythm: atrial fibrillation and supraventricular tachycardia  Rate: tachycardic  BPM: 153    Clinical impression: abnormal EKG  Comments: A. fib with RVR, ST  depression          Interpretation Summary    · Mag Silva monitored for 12d 10h starting on 10/04/2021 The average heart rate, excluding ectopy, was 64 BPM with a minumim of 46 BPM on Day 9 and a maximum of 114 BPM on Day 9. SVE: [place] was 0.68% , max count per 24 hours 635 SVT:126 event, longest event 26 beats on day 5, fastest event 169 bpm on day 5 PVC: burden was 2.96%, max count per 24 hours 2764,1 disparate morphologies VENTRICULAR TACHYCARDIA: 4 events, longest event 6 at Day 8 fastest rate 193 bpm at 8 :  · An abnormal monitor study.  · NSR, FREQUENT PACS, PVCS, COUPLETS, NSSVTS      Echo 8/31/21  Interpretation Summary    · Calculated left ventricular EF = 78.2% Estimated left ventricular EF was in agreement with the calculated left ventricular EF.  · Left ventricular diastolic function was normal.  · There is no evidence of pericardial effusion. .     Stress test 8/31/21  Interpretation Summary       · Findings consistent with a normal ECG stress test.  · Left ventricular ejection fraction is hyperdynamic (Calculated EF > 70%). .  · Myocardial perfusion imaging indicates a normal myocardial perfusion study with no evidence of ischemia.  · Impressions are consistent with a low risk study.         Carotid Artery duplex 8/2017  Interpretation Summary    · Proximal right internal carotid artery moderate stenosis.  · Proximal left internal carotid artery moderate stenosis.     Cardiac Cath 10/2015  FINAL CONCLUSION:  1.  Normal left main.   2.  A 30% to 40% proximal LAD, 40% diagonal.   3.  A 40% OM intrastent stenosis and distal RCA 99% down to 0% with a 3.0 x 15  Xience dilated to 3.2.   4.  Normal left ventriculogram.      DISCUSSION: Results of angioplasty: Successful angioplasty and stent to the  distal RCA reduced from 99% with thrombus down to 0% with a 3.0 x 15 dilated to  3.2 Xience stent    Current Outpatient Medications:   •  Acetaminophen (TYLENOL 8 HOUR ARTHRITIS PAIN PO), Take  by mouth.,  Disp: , Rfl:   •  apixaban (ELIQUIS) 5 MG tablet tablet, Take 1 tablet by mouth Every 12 (Twelve) Hours. Indications: Atrial Fibrillation, Atrial Fibrillation, Disp: 60 tablet, Rfl: 12  •  aspirin 81 MG chewable tablet, Chew 81 mg Every Night., Disp: , Rfl:   •  atorvastatin (LIPITOR) 10 MG tablet, Take 10 mg by mouth Daily., Disp: , Rfl:   •  Cholecalciferol (VITAMIN D3) 2000 UNITS capsule, Take 2,000 Units by mouth Every Night., Disp: , Rfl:   •  famotidine (PEPCID) 20 MG tablet, Take 20 mg by mouth 2 (Two) Times a Day As Needed., Disp: , Rfl:   •  metoprolol tartrate (LOPRESSOR) 25 MG tablet, Take 1 tablet by mouth Every 12 (Twelve) Hours., Disp: 180 tablet, Rfl: 3  •  Multiple Vitamins-Minerals (MULTI COMPLETE PO), Take  by mouth., Disp: , Rfl:   •  nitroglycerin (NITROSTAT) 0.4 MG SL tablet, 1 under the tongue as needed for angina, may repeat q5mins for up three doses, Disp: 25 tablet, Rfl: 3  •  olmesartan-hydrochlorothiazide (Benicar HCT) 40-25 MG per tablet, Take 1 tablet by mouth Daily., Disp: 90 tablet, Rfl: 3  •  Omega-3 Fatty Acids (FISH OIL) 1000 MG capsule capsule, Take  by mouth Daily With Breakfast., Disp: , Rfl:   •  tiotropium bromide-olodaterol (STIOLTO RESPIMAT) 2.5-2.5 MCG/ACT aerosol solution inhaler, Inhale Daily., Disp: , Rfl:   •  zolpidem (AMBIEN) 10 MG tablet, Take 10 mg by mouth At Night As Needed for sleep., Disp: , Rfl:   •  levocetirizine (XYZAL) 5 MG tablet, Take 5 mg by mouth Daily., Disp: , Rfl:      Assessment:        Patient Active Problem List   Diagnosis   • Coronary arteriosclerosis in native artery   • Benign essential hypertension   • Chronic coronary artery disease   • Chest pain   • Chronic obstructive pulmonary disease (HCC)   • Dyslipidemia   • Ventricular premature beats   • Syncope   • Hyperlipidemia   • Overweight   • New onset atrial fibrillation (HCC)   • Anticoagulated               Plan:   1.  A. fib with RVR: ECG in office today A. fib with RVR heart rate 153.   She reports she has not missed any doses of her Eliquis.  She has tried an additional metoprolol.  She will need to be admitted now for IV amiodarone and cardioversion.    Patient agreed to cardioversion, procedure risks/benefits(allergy, arrhythmia, skin trauma) and options of the cardioversion have been explained to the patient/family/PO.A.  He/she/all/they voiced understanding and agreement with treatment plan    Significant side effects associated with AMIODARONE therapy has been explained. Pros and cons of the medications has been discussed, decision has been to continue the medication   6 months to yearly checkup for eye examination, thyroid function test, chest x-ray and liver function test has been advised.  Patient understands well.    2.  CAD: Previous ischemic work-up as above.  Continue aspirin, statin, beta-blockade.    Risk reduction for the coronary artery disease, controlling the blood pressure, blood sugar management, cholesterol management, exercise, stress management, and proper compliance with medications and follow-up has been discussed    3.  Hypertension: Elevated presently well in A. fib with RVR.  May need medications adjusted will continue to evaluate while in hospital.    4.  Dyslipidemia: She reports her PCP manages her cholesterol labs.  Continue statin therapy.               No diagnosis found.    There are no diagnoses linked to this encounter.    COUNSELING: Samir Alegre was given to patient for the following topics: diagnostic results, risk factor reductions, impressions, risks and benefits of treatment options and importance of treatment compliance .         SMOKING COUNSELING: Denies    Admit now for IV amiodarone and cardioversion    Sincerely,   JENNIFER Márquez  [facility]  06/14/22  14:05 EDT    EMR Dragon/Transcription disclaimer:   Much of this encounter note is an electronic transcription/translation of spoken language to printed  text. The electronic translation of spoken language may permit erroneous, or at times, nonsensical words or phrases to be inadvertently transcribed; Although I have reviewed the note for such errors, some may still exist.    left

## 2024-02-20 NOTE — PATIENT PROFILE ADULT. - PAIN DESCRIPTION (FREQUENCY/QUALITY), PROFILE
Please schedule next available CT scan chest with appointment review same day, either clinic is fine. 
intermittent